# Patient Record
Sex: FEMALE | Race: WHITE | NOT HISPANIC OR LATINO | URBAN - METROPOLITAN AREA
[De-identification: names, ages, dates, MRNs, and addresses within clinical notes are randomized per-mention and may not be internally consistent; named-entity substitution may affect disease eponyms.]

---

## 2017-09-28 ENCOUNTER — HOSPITAL ENCOUNTER (OUTPATIENT)
Dept: CT IMAGING | Facility: HOSPITAL | Age: 37
Discharge: HOME | End: 2017-09-28
Attending: NURSE PRACTITIONER | Admitting: NURSE PRACTITIONER

## 2017-10-05 ENCOUNTER — HOSPITAL ENCOUNTER (OUTPATIENT)
Dept: NUCLEAR MEDICINE | Facility: HOSPITAL | Age: 37
Discharge: HOME | End: 2017-10-05
Attending: NURSE PRACTITIONER | Admitting: NURSE PRACTITIONER

## 2023-11-15 ENCOUNTER — SPECIALTY PHARMACY (OUTPATIENT)
Dept: ENDOCRINOLOGY | Facility: CLINIC | Age: 43
End: 2023-11-15
Payer: OTHER GOVERNMENT

## 2023-11-15 ENCOUNTER — OFFICE VISIT (OUTPATIENT)
Dept: ENDOCRINOLOGY | Facility: CLINIC | Age: 43
End: 2023-11-15
Payer: OTHER GOVERNMENT

## 2023-11-15 VITALS
HEIGHT: 64 IN | SYSTOLIC BLOOD PRESSURE: 128 MMHG | HEART RATE: 95 BPM | BODY MASS INDEX: 37.87 KG/M2 | WEIGHT: 221.8 LBS | DIASTOLIC BLOOD PRESSURE: 68 MMHG | OXYGEN SATURATION: 99 %

## 2023-11-15 DIAGNOSIS — J30.9 CHRONIC ALLERGIC RHINITIS: ICD-10-CM

## 2023-11-15 DIAGNOSIS — E78.2 MIXED HYPERLIPIDEMIA: ICD-10-CM

## 2023-11-15 DIAGNOSIS — E66.01 CLASS 2 SEVERE OBESITY WITH SERIOUS COMORBIDITY AND BODY MASS INDEX (BMI) OF 38.0 TO 38.9 IN ADULT, UNSPECIFIED OBESITY TYPE: Primary | ICD-10-CM

## 2023-11-15 PROCEDURE — 99204 OFFICE O/P NEW MOD 45 MIN: CPT | Performed by: INTERNAL MEDICINE

## 2023-11-15 RX ORDER — SEMAGLUTIDE 0.25 MG/.5ML
0.25 INJECTION, SOLUTION SUBCUTANEOUS WEEKLY
Qty: 2 ML | Refills: 5 | Status: SHIPPED | OUTPATIENT
Start: 2023-11-15

## 2023-11-15 RX ORDER — BENZONATATE 200 MG/1
CAPSULE ORAL
COMMUNITY
Start: 2023-10-05 | End: 2023-11-15

## 2023-11-15 RX ORDER — TIRZEPATIDE 2.5 MG/.5ML
2.5 INJECTION, SOLUTION SUBCUTANEOUS WEEKLY
Qty: 2 ML | Refills: 5 | Status: SHIPPED | OUTPATIENT
Start: 2023-11-15

## 2023-11-15 RX ORDER — FLUCONAZOLE 150 MG/1
TABLET ORAL
COMMUNITY
Start: 2023-10-05 | End: 2023-11-15

## 2023-11-15 RX ORDER — AMOXICILLIN AND CLAVULANATE POTASSIUM 875; 125 MG/1; MG/1
1 TABLET, FILM COATED ORAL EVERY 12 HOURS SCHEDULED
COMMUNITY
Start: 2023-10-05 | End: 2023-11-15

## 2023-11-15 RX ORDER — LORATADINE 10 MG/1
10 TABLET ORAL DAILY
COMMUNITY

## 2023-11-15 RX ORDER — DEXTROMETHORPHAN HYDROBROMIDE AND PROMETHAZINE HYDROCHLORIDE 15; 6.25 MG/5ML; MG/5ML
SYRUP ORAL
COMMUNITY
Start: 2023-10-05 | End: 2023-11-15

## 2023-11-15 RX ORDER — TIRZEPATIDE 2.5 MG/.5ML
2.5 INJECTION, SOLUTION SUBCUTANEOUS WEEKLY
Qty: 2 ML | Refills: 5 | Status: SHIPPED | OUTPATIENT
Start: 2023-11-15 | End: 2023-11-15 | Stop reason: SDUPTHER

## 2023-11-15 NOTE — PATIENT INSTRUCTIONS
Please,    -I have started you on Mounjaro therapy 2.5 mg once a week.  If covered by the insurance we will start the medication and uptitrate the dose every 4 weeks if tolerated.  Plan is to go to the maximum therapy tolerated without significant side effects.    - If Mounjaro is not covered we will try to get coverage for Wegovy.    If your medications are approved and you can continue to follow-up with me every 3 months.  Repeat fasting blood work before next visit.    Thank you for your visit today.    If you have any questions or concerns please feel free to reach out of the office.

## 2023-11-15 NOTE — PROGRESS NOTES
-----------------------------------------------------------------  ENDOCRINE CLINIC NOTE  -----------------------------------------------------------------        PATIENT NAME: Brooke Cadet  PATIENT : 1980 AGE: 43 y.o.  MRN NUMBER: [id number]  PRIMARY CARE: Shelbie Patterson APRN    ==========================================================================    CHIEF COMPLAINT: Weight management  DATE OF SERVICE: 11/15/23         ==========================================================================    HPI / SUBJECTIVE    43 y.o. female is seen in the clinic today for evaluation of weight management.  Patient reports that for last year and a half she had been persistently gaining weight she have tried multiple lifestyle modification that includes dietary changes and physical exercise but she continues to either hold the same weight or slowly keep gaining weight.  Patient have tried other lifestyle modification that includes total meal replacement therapy with no significant benefit as well.  Patient is also tried pharmacological management with no significant benefit as well.  Primary care evaluated patient with blood work that includes TSH and free T4 which was also within normal limits.  Patient is otherwise , she has 3 kids including a twin.  Patient have a history of endometriosis and s/p hysterectomy .  Patient have both ovaries intact.  Denied any previous history of pancreatitis.  Denies any family history of pancreatitis or medullary thyroid cancer.    ==========================================================================                                                PAST MEDICAL HISTORY    History reviewed. No pertinent past medical history.    ==========================================================================    PAST SURGICAL HISTORY    Past Surgical History:   Procedure Laterality Date    HYSTERECTOMY  2002        ==========================================================================    FAMILY HISTORY    Family History   Problem Relation Age of Onset    Cancer Maternal Grandfather         colon    Diabetes Maternal Grandfather        ==========================================================================    SOCIAL HISTORY    Social History     Socioeconomic History    Marital status:    Tobacco Use    Smoking status: Former     Types: Cigarettes     Quit date:      Years since quittin.8   Vaping Use    Vaping Use: Never used   Substance and Sexual Activity    Alcohol use: Yes     Comment: socially    Drug use: Never    Sexual activity: Defer       ==========================================================================    MEDICATIONS      Current Outpatient Medications:     loratadine (CLARITIN) 10 MG tablet, Take 1 tablet by mouth Daily., Disp: , Rfl:     Tirzepatide (Mounjaro) 2.5 MG/0.5ML solution pen-injector, Inject 0.5 mL under the skin into the appropriate area as directed 1 (One) Time Per Week., Disp: 2 mL, Rfl: 5    ==========================================================================    ALLERGIES    Allergies   Allergen Reactions    Bactrim [Sulfamethoxazole-Trimethoprim] Anaphylaxis    Sulfa Antibiotics Anaphylaxis    Trimethoprim Other (See Comments)       ==========================================================================    OBJECTIVE    Vitals:    11/15/23 1325   BP: 128/68   Pulse: 95   SpO2: 99%     Body mass index is 38.07 kg/m².     General: Alert, cooperative, no acute distress  Thyroid:  no enlargement/tenderness/palpable nodules  Lungs: Clear to auscultation bilaterally, respirations unlabored  Heart: Regular rate and rhythm, S1 and S2 normal, no murmur, rub or gallop  Abdomen: Soft, NT, ND and Bowel sounds Positive  Extremities:  Extremities normal, atraumatic, no cyanosis or  "edema    ==========================================================================    LAB EVALUATION    Lab work reviewed from referral.    ==========================================================================    ASSESSMENT AND PLAN    #Obesity with BMI of 38.07  - Discussed with patient in detail about importance of continued lifestyle modification and exercise.  - Patient to consume high-protein and low carbohydrate diet to which she verbalized understanding.  - Discussed with patient about hidden carbohydrates in the meal to which she verbalized understanding.  - Patient will benefit from GLP-1 receptor agonist therapy for weight management and she has no contraindications.  - We will try to get coverage for Mounjaro therapy, if approved we will start with 2.5 mg and uptitrating dose.    #Chronic seasonal allergies, on Claritin therapy over-the-counter    #Hyperlipidemia, care as per primary team      Thank you for courtesy of consultation.    Return to clinic: 3 months time repeat blood work before next visit, fasting    Entire assessment and plan was discussed and counseled the patient in detail to which patient verbalized understanding and agreed with care.  Answered all queries and concerns.    This note was created using voice recognition software and is inherently subject to errors including those of syntax and \"sound-alike\" substitutions which may escape proofreading.  In such instances, original meaning may be extrapolated by contextual derivation.    Note: Portions of this note may have been copied from previous notes but documentation have been reviewed and edited as necessary to support clinical decision making for today's visit.    ==========================================================================    INFORMATION PROVIDED TO PATIENT    Patient Instructions   Please,    -I have started you on Mounjaro therapy 2.5 mg once a week.  If covered by the insurance we will start the medication and " uptitrate the dose every 4 weeks if tolerated.  Plan is to go to the maximum therapy tolerated without significant side effects.    - If Mounjaro is not covered we will try to get coverage for Wegovy.    If your medications are approved and you can continue to follow-up with me every 3 months.  Repeat fasting blood work before next visit.    Thank you for your visit today.    If you have any questions or concerns please feel free to reach out of the office.       ==========================================================================  Chalo Dawn MD  Department of Endocrine, Diabetes and Metabolism  [place], [place]  ==========================================================================

## 2023-11-17 NOTE — PROGRESS NOTES
Benefits Investigation Summary    Prescription: New Therapy- Wegovy  or mounjarp      PLAN: Steffen  BIN: [id number]  PCN: [id number]  RX GROUP: [id number] Auth and Med Assistance notes: Unfortunately, [facility] pharmacy is not contracted with this patient's insurance, so we are unable to get claims. Patient checked with the pharmacy and they told her the medication would need a PA. PA submitted for both mounjaro and wegovy, both were denied.    [facility] CMM: (Key: [id number])  Mounjaro case #: [id number]    Called to notify patient of denial. Patient expressed understanding and asked that her follow up appointment be canceled since she is unable to get the medication.    Concha Saleh, PharmD  Clinical Specialty Pharmacist, Endocrinology  11/17/2023  15:10 EST

## 2023-11-30 ENCOUNTER — TELEPHONE (OUTPATIENT)
Dept: ENDOCRINOLOGY | Facility: CLINIC | Age: 43
End: 2023-11-30
Payer: OTHER GOVERNMENT

## 2023-11-30 NOTE — TELEPHONE ENCOUNTER
Previously attempted to get coverage for wegovy and mounjaro. Both were denied (denial letters scanned into medica tab). Patient requested that we submit an appeal to the insurance company. The appeal letter and office notes have been sent to the insurance for review. The appeal review can take up to 30 days, which patient is aware of.     Concha Saleh, PharmD  Clinical Specialty Pharmacist, Endocrinology  11/30/2023  13:41 EST

## 2023-12-12 ENCOUNTER — TELEPHONE (OUTPATIENT)
Dept: ENDOCRINOLOGY | Facility: CLINIC | Age: 43
End: 2023-12-12
Payer: OTHER GOVERNMENT

## 2023-12-12 NOTE — TELEPHONE ENCOUNTER
Called to provide patient with an update about the wegovy appeal being denied. Explained to patient that we are happy to try again in the future if her insurance changes or the formulary changes.  Patient expressed understanding and had no further questions at this time.    Concha Saleh, PharmD  Clinical Specialty Pharmacist, Endocrinology  12/12/2023  08:51 EST